# Patient Record
Sex: FEMALE | Race: WHITE | Employment: FULL TIME | URBAN - METROPOLITAN AREA
[De-identification: names, ages, dates, MRNs, and addresses within clinical notes are randomized per-mention and may not be internally consistent; named-entity substitution may affect disease eponyms.]

---

## 2018-03-21 ENCOUNTER — HOSPITAL ENCOUNTER (OUTPATIENT)
Dept: GENERAL RADIOLOGY | Age: 48
Discharge: HOME | End: 2018-03-23
Payer: OTHER GOVERNMENT

## 2018-03-21 DIAGNOSIS — R92.8 ABNORMAL MAMMOGRAM: ICD-10-CM

## 2018-03-21 PROCEDURE — 76882 US LMTD JT/FCL EVL NVASC XTR: CPT

## 2018-03-27 ENCOUNTER — HOSPITAL ENCOUNTER (OUTPATIENT)
Dept: GENERAL RADIOLOGY | Age: 48
Discharge: HOME | End: 2018-03-29
Payer: OTHER GOVERNMENT

## 2018-03-27 DIAGNOSIS — N63.20 BREAST MASS, LEFT: ICD-10-CM

## 2018-03-27 DIAGNOSIS — R59.0 ENLARGED LYMPH NODES IN ARMPIT: ICD-10-CM

## 2018-03-27 PROCEDURE — 10035 PLMT SFT TISS LOCLZJ DEV 1ST: CPT

## 2018-03-27 PROCEDURE — 77065 DX MAMMO INCL CAD UNI: CPT

## 2018-03-27 PROCEDURE — 88360 TUMOR IMMUNOHISTOCHEM/MANUAL: CPT

## 2018-03-27 PROCEDURE — 2500000003 HC RX 250 WO HCPCS

## 2018-03-27 PROCEDURE — 2720000010 US BREAST BIOPSY W LOC DEVICE 1ST LESION LEFT

## 2018-03-27 PROCEDURE — A4648 IMPLANTABLE TISSUE MARKER: HCPCS

## 2018-03-27 PROCEDURE — 88305 TISSUE EXAM BY PATHOLOGIST: CPT

## 2018-04-06 ENCOUNTER — TELEPHONE (OUTPATIENT)
Dept: GENERAL RADIOLOGY | Age: 48
End: 2018-04-06

## 2019-04-30 ENCOUNTER — HOSPITAL ENCOUNTER (OUTPATIENT)
Dept: MRI IMAGING | Age: 49
Discharge: HOME | End: 2019-05-02
Payer: OTHER GOVERNMENT

## 2019-05-10 NOTE — PROGRESS NOTES
[facility] [employer] [facility] [employer] regarding breast MRI that could not be done due to metal tissue expander in place. [phone]. Also faxed information to [phone] with confirmation.

## 2019-05-14 NOTE — PROGRESS NOTES
Spoke to Bill at the [facility] in [facility] [place]. Office is aware MRI could not be done due to metal expanders in chest.  Bill said she would let [facility] know if they need our services in the future for the patient.

## 2023-04-02 ENCOUNTER — HOSPITAL ENCOUNTER (EMERGENCY)
Age: 53
Discharge: HOME | End: 2023-04-02
Attending: FAMILY MEDICINE
Payer: OTHER GOVERNMENT

## 2023-04-02 VITALS
SYSTOLIC BLOOD PRESSURE: 103 MMHG | RESPIRATION RATE: 16 BRPM | WEIGHT: 180 LBS | HEART RATE: 90 BPM | OXYGEN SATURATION: 100 % | TEMPERATURE: 97.1 F | DIASTOLIC BLOOD PRESSURE: 83 MMHG | BODY MASS INDEX: 28.93 KG/M2 | HEIGHT: 66 IN

## 2023-04-02 DIAGNOSIS — J40 BRONCHITIS: Primary | ICD-10-CM

## 2023-04-02 PROCEDURE — 99283 EMERGENCY DEPT VISIT LOW MDM: CPT

## 2023-04-02 RX ORDER — PREDNISONE 20 MG/1
40 TABLET ORAL DAILY
Qty: 8 TABLET | Refills: 0 | Status: SHIPPED | OUTPATIENT
Start: 2023-04-02 | End: 2023-04-06

## 2023-04-02 RX ORDER — DEXTROMETHORPHAN HYDROBROMIDE AND PROMETHAZINE HYDROCHLORIDE 15; 6.25 MG/5ML; MG/5ML
5 SYRUP ORAL 4 TIMES DAILY PRN
Qty: 118 ML | Refills: 0 | Status: SHIPPED | OUTPATIENT
Start: 2023-04-02 | End: 2023-04-09

## 2023-04-02 RX ORDER — BENZONATATE 100 MG/1
100 CAPSULE ORAL 3 TIMES DAILY PRN
Qty: 20 CAPSULE | Refills: 0 | Status: SHIPPED | OUTPATIENT
Start: 2023-04-02 | End: 2023-04-09

## 2023-04-02 RX ORDER — BENZONATATE 100 MG/1
100 CAPSULE ORAL 2 TIMES DAILY PRN
Qty: 20 CAPSULE | Refills: 0 | Status: SHIPPED | OUTPATIENT
Start: 2023-04-02 | End: 2023-04-02 | Stop reason: SDUPTHER

## 2023-04-02 ASSESSMENT — PAIN - FUNCTIONAL ASSESSMENT: PAIN_FUNCTIONAL_ASSESSMENT: NONE - DENIES PAIN

## 2023-04-02 NOTE — ED PROVIDER NOTES
HPI:  23,   Time: 2:40 PM EDT     Morgan Sims is a 46 y.o. female presenting to the ED for A 1 month history of cough, initially was associated with nasal congestion postnasal drainage, that lasted about 2 weeks and seems to have resolved, but the cough has persisted, worse at nighttime when she coughs in paroxysms. She does not smoke cigarettes. She has a distant history of using metered-dose inhalers for an unspecified reason but has not had felt the need to use them for several years. She denies fever chills or body aches. She denies shortness of breath. ROS:   Pertinent positives and negatives are stated within HPI, all other systems reviewed and are negative.  --------------------------------------------- PAST HISTORY ---------------------------------------------  Past Medical History:  has a past medical history of History of blood clotting disorder and History of cardiovascular stress test.    Past Surgical History:  has a past surgical history that includes  section; Cholecystectomy (2011); back surgery (2007); hysteroscopy (2015); Mastectomy, bilateral (Bilateral); and Hysterectomy. Social History:  reports that she has quit smoking. She does not have any smokeless tobacco history on file. She reports that she does not drink alcohol and does not use drugs. Family History: family history includes Heart Disease in her brother and father. The patients home medications have been reviewed. Allergies: Adhesive tape    -------------------------------------------------- RESULTS -------------------------------------------------  All laboratory and radiology results have been personally reviewed by myself   LABS:  No results found for this visit on 23.     RADIOLOGY:  Interpreted by Radiologist.  No orders to display       ------------------------- NURSING NOTES AND VITALS REVIEWED ---------------------------   The nursing notes within the ED encounter